# Patient Record
Sex: MALE | Race: WHITE | Employment: FULL TIME | URBAN - NONMETROPOLITAN AREA
[De-identification: names, ages, dates, MRNs, and addresses within clinical notes are randomized per-mention and may not be internally consistent; named-entity substitution may affect disease eponyms.]

---

## 2022-09-27 ENCOUNTER — OFFICE VISIT (OUTPATIENT)
Dept: INTERNAL MEDICINE CLINIC | Age: 22
End: 2022-09-27
Payer: COMMERCIAL

## 2022-09-27 VITALS
BODY MASS INDEX: 25.21 KG/M2 | WEIGHT: 207 LBS | DIASTOLIC BLOOD PRESSURE: 66 MMHG | HEART RATE: 65 BPM | SYSTOLIC BLOOD PRESSURE: 118 MMHG | HEIGHT: 76 IN | OXYGEN SATURATION: 100 %

## 2022-09-27 DIAGNOSIS — Z87.09 H/O EXTRINSIC ASTHMA: ICD-10-CM

## 2022-09-27 DIAGNOSIS — B35.3 TINEA PEDIS OF BOTH FEET: ICD-10-CM

## 2022-09-27 PROCEDURE — 99203 OFFICE O/P NEW LOW 30 MIN: CPT | Performed by: INTERNAL MEDICINE

## 2022-09-27 PROCEDURE — G8427 DOCREV CUR MEDS BY ELIG CLIN: HCPCS | Performed by: INTERNAL MEDICINE

## 2022-09-27 PROCEDURE — 4004F PT TOBACCO SCREEN RCVD TLK: CPT | Performed by: INTERNAL MEDICINE

## 2022-09-27 PROCEDURE — G8419 CALC BMI OUT NRM PARAM NOF/U: HCPCS | Performed by: INTERNAL MEDICINE

## 2022-09-27 RX ORDER — NAFTIFINE HYDROCHLORIDE 20 MG/G
CREAM TOPICAL
Qty: 45 G | Refills: 1 | Status: SHIPPED | OUTPATIENT
Start: 2022-09-27

## 2022-09-27 NOTE — PROGRESS NOTES
Ceasar Francis  Patient's  is 2000  Seen in office on 2022      SUBJECTIVE:  Melecio Romero kelvin 20 y. o.year old male presents today   Chief Complaint   Patient presents with    New Patient     New patient  Complaining of rash on both feet off and on for several months. Patient used medication for athletes feet. He does not know the name of the medications or creams he used. Patient states his feet sweat  Patient denies any history of diabetes. He has a history of asthma as a child. He has not used any albuterol inhaler for few years. He has no problems. No cough or sputum production no asthma  Taking medications regularly. No side effects noted. Review of Systems  Review of system normal except as in HPI  OBJECTIVE: /66   Pulse 65   Ht 6' 4\" (1.93 m)   Wt 207 lb (93.9 kg)   SpO2 100%   BMI 25.20 kg/m²     Wt Readings from Last 3 Encounters:   22 207 lb (93.9 kg)   16 185 lb (83.9 kg) (92 %, Z= 1.39)*     * Growth percentiles are based on CDC (Boys, 2-20 Years) data. Patient was seen taking COVID-19 precautions. Face mask, gloves were used. Patient also wore facemask. On overall a day, bagging was difficult hectometer    Corticosteroid. Taking current  GENERAL: - Alert, oriented, pleasant, in no apparent distress. HEENT: - Conjunctiva pink, no scleral icterus. ENT clear. NECK: -Supple. No jugular venous distention noted. No masses felt,  CARDIOVASCULAR: - Normal S1 and S2    PULMONARY: - No respiratory distress. No wheezes or rales. ABDOMEN: - Soft and non-tender,no masses  ororganomegaly. EXTREMITIES: - No cyanosis, clubbing, or significant edema. SKIN: Skin is warm and dry. NEUROLOGICAL: - Cranial nerves II through XII are grossly intact. Patient has dry scaly rash of both feet especially in the base of the toes and in between the toes. IMPRESSION:    Encounter Diagnoses   Name Primary?     Tinea pedis of both feet     H/O extrinsic asthma

## 2023-03-31 ENCOUNTER — TELEPHONE (OUTPATIENT)
Dept: INTERNAL MEDICINE CLINIC | Age: 23
End: 2023-03-31

## 2023-03-31 NOTE — TELEPHONE ENCOUNTER
Attempted to contact patient and schedule a follow up or annual physical.  Patient declined any needs at this time but will call if he needs to be seen.